# Patient Record
Sex: MALE | Race: BLACK OR AFRICAN AMERICAN | NOT HISPANIC OR LATINO | Employment: FULL TIME | URBAN - METROPOLITAN AREA
[De-identification: names, ages, dates, MRNs, and addresses within clinical notes are randomized per-mention and may not be internally consistent; named-entity substitution may affect disease eponyms.]

---

## 2018-01-04 ENCOUNTER — APPOINTMENT (OUTPATIENT)
Dept: GENERAL RADIOLOGY | Facility: CLINIC | Age: 33
End: 2018-01-04
Attending: EMERGENCY MEDICINE
Payer: COMMERCIAL

## 2018-01-04 ENCOUNTER — HOSPITAL ENCOUNTER (EMERGENCY)
Facility: CLINIC | Age: 33
Discharge: HOME | End: 2018-01-04
Attending: EMERGENCY MEDICINE | Admitting: EMERGENCY MEDICINE
Payer: COMMERCIAL

## 2018-01-04 VITALS
RESPIRATION RATE: 20 BRPM | SYSTOLIC BLOOD PRESSURE: 116 MMHG | DIASTOLIC BLOOD PRESSURE: 76 MMHG | OXYGEN SATURATION: 95 % | TEMPERATURE: 98.5 F | HEART RATE: 76 BPM

## 2018-01-04 DIAGNOSIS — J10.1 INFLUENZA A: ICD-10-CM

## 2018-01-04 DIAGNOSIS — R07.89 CHEST WALL PAIN: ICD-10-CM

## 2018-01-04 LAB
ALBUMIN SERPL-MCNC: 3.4 G/DL (ref 3.4–5)
ALP SERPL-CCNC: 57 U/L (ref 40–150)
ALT SERPL W P-5'-P-CCNC: 27 U/L (ref 0–70)
ANION GAP SERPL CALCULATED.3IONS-SCNC: 6 MMOL/L (ref 3–14)
AST SERPL W P-5'-P-CCNC: 23 U/L (ref 0–45)
BASOPHILS # BLD AUTO: 0 10E9/L (ref 0–0.2)
BASOPHILS NFR BLD AUTO: 0.2 %
BILIRUB SERPL-MCNC: 0.2 MG/DL (ref 0.2–1.3)
BUN SERPL-MCNC: 14 MG/DL (ref 7–30)
CALCIUM SERPL-MCNC: 8.5 MG/DL (ref 8.5–10.1)
CHLORIDE SERPL-SCNC: 108 MMOL/L (ref 94–109)
CO2 SERPL-SCNC: 29 MMOL/L (ref 20–32)
CREAT SERPL-MCNC: 0.82 MG/DL (ref 0.66–1.25)
DIFFERENTIAL METHOD BLD: ABNORMAL
EOSINOPHIL # BLD AUTO: 0.1 10E9/L (ref 0–0.7)
EOSINOPHIL NFR BLD AUTO: 1.3 %
ERYTHROCYTE [DISTWIDTH] IN BLOOD BY AUTOMATED COUNT: 12.7 % (ref 10–15)
FLUAV+FLUBV AG SPEC QL: NEGATIVE
FLUAV+FLUBV AG SPEC QL: POSITIVE
GFR SERPL CREATININE-BSD FRML MDRD: >90 ML/MIN/1.7M2
GLUCOSE SERPL-MCNC: 93 MG/DL (ref 70–99)
HCT VFR BLD AUTO: 39.9 % (ref 40–53)
HGB BLD-MCNC: 13.2 G/DL (ref 13.3–17.7)
IMM GRANULOCYTES # BLD: 0 10E9/L (ref 0–0.4)
IMM GRANULOCYTES NFR BLD: 0 %
LIPASE SERPL-CCNC: 135 U/L (ref 73–393)
LYMPHOCYTES # BLD AUTO: 1.7 10E9/L (ref 0.8–5.3)
LYMPHOCYTES NFR BLD AUTO: 39.1 %
MCH RBC QN AUTO: 28.6 PG (ref 26.5–33)
MCHC RBC AUTO-ENTMCNC: 33.1 G/DL (ref 31.5–36.5)
MCV RBC AUTO: 86 FL (ref 78–100)
MONOCYTES # BLD AUTO: 0.6 10E9/L (ref 0–1.3)
MONOCYTES NFR BLD AUTO: 12.8 %
NEUTROPHILS # BLD AUTO: 2.1 10E9/L (ref 1.6–8.3)
NEUTROPHILS NFR BLD AUTO: 46.6 %
NRBC # BLD AUTO: 0 10*3/UL
NRBC BLD AUTO-RTO: 0 /100
PLATELET # BLD AUTO: 205 10E9/L (ref 150–450)
POTASSIUM SERPL-SCNC: 3.8 MMOL/L (ref 3.4–5.3)
PROT SERPL-MCNC: 7.4 G/DL (ref 6.8–8.8)
RBC # BLD AUTO: 4.62 10E12/L (ref 4.4–5.9)
SODIUM SERPL-SCNC: 143 MMOL/L (ref 133–144)
SPECIMEN SOURCE: ABNORMAL
TROPONIN I SERPL-MCNC: <0.015 UG/L (ref 0–0.04)
WBC # BLD AUTO: 4.5 10E9/L (ref 4–11)

## 2018-01-04 PROCEDURE — 84484 ASSAY OF TROPONIN QUANT: CPT | Performed by: EMERGENCY MEDICINE

## 2018-01-04 PROCEDURE — 25000132 ZZH RX MED GY IP 250 OP 250 PS 637: Performed by: EMERGENCY MEDICINE

## 2018-01-04 PROCEDURE — 93010 ELECTROCARDIOGRAM REPORT: CPT | Mod: Z6 | Performed by: EMERGENCY MEDICINE

## 2018-01-04 PROCEDURE — 99285 EMERGENCY DEPT VISIT HI MDM: CPT | Mod: 25 | Performed by: EMERGENCY MEDICINE

## 2018-01-04 PROCEDURE — 99285 EMERGENCY DEPT VISIT HI MDM: CPT | Performed by: EMERGENCY MEDICINE

## 2018-01-04 PROCEDURE — 83690 ASSAY OF LIPASE: CPT | Performed by: EMERGENCY MEDICINE

## 2018-01-04 PROCEDURE — 93005 ELECTROCARDIOGRAM TRACING: CPT | Performed by: EMERGENCY MEDICINE

## 2018-01-04 PROCEDURE — 80053 COMPREHEN METABOLIC PANEL: CPT | Performed by: EMERGENCY MEDICINE

## 2018-01-04 PROCEDURE — 87804 INFLUENZA ASSAY W/OPTIC: CPT | Mod: 91 | Performed by: EMERGENCY MEDICINE

## 2018-01-04 PROCEDURE — 71046 X-RAY EXAM CHEST 2 VIEWS: CPT

## 2018-01-04 PROCEDURE — 85025 COMPLETE CBC W/AUTO DIFF WBC: CPT | Performed by: EMERGENCY MEDICINE

## 2018-01-04 RX ORDER — CODEINE PHOSPHATE AND GUAIFENESIN 10; 100 MG/5ML; MG/5ML
1-2 SOLUTION ORAL EVERY 6 HOURS PRN
Qty: 180 ML | Refills: 0 | Status: SHIPPED | OUTPATIENT
Start: 2018-01-04 | End: 2018-01-08

## 2018-01-04 RX ORDER — HYDROCODONE BITARTRATE AND ACETAMINOPHEN 5; 325 MG/1; MG/1
2 TABLET ORAL ONCE
Status: COMPLETED | OUTPATIENT
Start: 2018-01-04 | End: 2018-01-04

## 2018-01-04 RX ADMIN — HYDROCODONE BITARTRATE AND ACETAMINOPHEN 2 TABLET: 5; 325 TABLET ORAL at 11:50

## 2018-01-04 ASSESSMENT — ENCOUNTER SYMPTOMS
FEVER: 1
SHORTNESS OF BREATH: 1
HEADACHES: 1
COUGH: 1

## 2018-01-04 NOTE — ED AVS SNAPSHOT
[facility], Emergency Department    [address]    [place] [address]    Phone:  [phone]    Fax:  [phone]                                       Martínez Mccartney   MRN: [id number]    Department:  [facility], Emergency Department   Date of Visit:  1/4/2018           Patient Information     Date Of Birth          1985        Your diagnoses for this visit were:     Influenza A     Chest wall pain        You were seen by Kathy Rubio MD.        Discharge Instructions       Work note given (no work today and tomorrow).     You can alternate tylenol and ibuprofen for fever and body aches.    Dosing:  Ibuprofen 600 mg every 6 hours if needed (take with food to avoid stomach upset); Tylenol regular strength tablets:  650 mg every 6 hours if needed.     You can take robitussin AC for cough.         Influenza (Adult)    Influenza is also called the flu. It is a viral illness that affects the air passages of your lungs. It is different from the common cold. The flu can easily be passed from one to person to another. It may be spread through the air by coughing and sneezing. Or it can be spread by touching the sick person and then touching your own eyes, nose, or mouth.  The flu starts 1 to 3 days after you are exposed to the flu virus. It may last for 1 to 2 weeks but many people feel tired or fatigued for many weeks afterward. You usually don t need to take antibiotics unless you have a complication. This might be an ear or sinus infection or pneumonia.  Symptoms of the flu may be mild or severe. They can include extreme tiredness (wanting to stay in bed all day), chills, fevers, muscle aches, soreness with eye movement, headache, and a dry, hacking cough.  Home care  Follow these guidelines when caring for yourself at home:    Avoid being around cigarette smoke, whether yours or other people s.    Acetaminophen or ibuprofen will help ease your fever, muscle aches, and headache. Don t give aspirin  to anyone younger than 18 who has the flu. Aspirin can harm the liver.    Nausea and loss of appetite are common with the flu. Eat light meals. Drink 6 to 8 glasses of liquids every day. Good choices are water, sport drinks, soft drinks without caffeine, juices, tea, and soup. Extra fluids will also help loosen secretions in your nose and lungs.    Over-the-counter cold medicines will not make the flu go away faster. But the medicines may help with coughing, sore throat, and congestion in your nose and sinuses. Don t use a decongestant if you have high blood pressure.    Stay home until your fever has been gone for at least 24 hours without using medicine to reduce fever.  Follow-up care  Follow up with your healthcare provider, or as advised, if you are not getting better over the next week.  If you are age 65 or older, talk with your provider about getting a pneumococcal vaccine every 5 years. You should also get this vaccine if you have chronic asthma or COPD. All adults should get a flu vaccine every fall. Ask your provider about this.  When to seek medical advice  Call your healthcare provider right away if any of these occur:    Cough with lots of colored mucus (sputum) or blood in your mucus    Chest pain, shortness of breath, wheezing, or trouble breathing    Severe headache, or face, neck, or ear pain    New rash with fever    Fever of 100.4 F (38 C) or higher, or as directed by your healthcare provider    Confusion, behavior change, or seizure    Severe weakness or dizziness    You get a new fever or cough after getting better for a few days  Date Last Reviewed: 1/1/2017 2000-2017 [employer]. [address] [place] [address]. All rights reserved. This information is not intended as a substitute for professional medical care. Always follow your healthcare professional's instructions.          24 Hour Appointment Hotline       To make an appointment at any [facility], call  [phone] ([phone]). If you don't have a family doctor or clinic, we will help you find one. [facility] clinics are conveniently located to serve the needs of you and your family.             Review of your medicines      START taking        Dose / Directions Last dose taken    guaiFENesin-codeine 100-10 MG/5ML Soln solution   Commonly known as:  ROBITUSSIN AC   Dose:  1-2 tsp.   Quantity:  180 mL        Take 5-10 mLs by mouth every 6 hours as needed for cough   Refills:  0                Prescriptions were sent or printed at these locations (1 Prescription)                   Other Prescriptions                Printed at Department/Unit printer (1 of 1)         guaiFENesin-codeine (ROBITUSSIN AC) 100-10 MG/5ML SOLN solution                Procedures and tests performed during your visit     CBC with platelets differential    Comprehensive metabolic panel    Influenza A/B antigen swab    Lipase    Troponin I    XR Chest 2 Views      Orders Needing Specimen Collection     None      Pending Results     No orders found from 1/2/2018 to 1/5/2018.            Pending Culture Results     No orders found from 1/2/2018 to 1/5/2018.            Pending Results Instructions     If you had any lab results that were not finalized at the time of your Discharge, you can call the ED Lab Result RN at [phone]. You will be contacted by this team for any positive Lab results or changes in treatment. The nurses are available 7 days a week from 10A to 6:30P.  You can leave a message 24 hours per day and they will return your call.        Thank you for choosing [facility]       Thank you for choosing [facility] for your care. Our goal is always to provide you with excellent care. Hearing back from our patients is one way we can continue to improve our services. Please take a few minutes to complete the written survey that you may receive in the mail after you visit with us. Thank you!        MyChart Information     [email or web address] lets  "you send messages to your doctor, view your test results, renew your prescriptions, schedule appointments and more. To sign up, go to [email or web address] . Click on \"Log in\" on the left side of the screen, which will take you to the Welcome page. Then click on \"Sign up Now\" on the right side of the page.     You will be asked to enter the access code listed below, as well as some personal information. Please follow the directions to create your username and password.     Your access code is: [email or web address]  Expires: 2018  1:02 PM     Your access code will  in 90 days. If you need help or a new code, please call your [facility] clinic or [phone].        Care EveryWhere ID     This is your Care EveryWhere ID. This could be used by other organizations to access your [facility] medical records  [id number]        Equal Access to Services     MIRANDA JORGENSEN : Hadii soha Hathaway, waaxda goldy, qaybta kaalmada obdulia, sol portillo. So [facility] [phone].    ATENCIÓN: Si habla español, tiene a dooley disposición servicios gratuitos de asistencia lingüística. Llame al [phone].    We comply with applicable federal civil rights laws and [place] laws. We do not discriminate on the basis of race, color, national origin, age, disability, sex, sexual orientation, or gender identity.            After Visit Summary       This is your record. Keep this with you and show to your community pharmacist(s) and doctor(s) at your next visit.                  "

## 2018-01-04 NOTE — ED NOTES
Bed: ED19  Expected date: 1/4/18  Expected time: 10:57 AM  Means of arrival: Ambulance  Comments:  [facility]  32 Male  Chest wall pain, cough  Lithuanian  needed

## 2018-01-04 NOTE — ED AVS SNAPSHOT
[facility], [place], Emergency Department    [address]    [facility] [address]    Phone:  [phone]    Fax:  [phone]                                       Martínez Mccartney   MRN: [id number]    Department:  [facility], Emergency Department   Date of Visit:  1/4/2018           After Visit Summary Signature Page     I have received my discharge instructions, and my questions have been answered. I have discussed any challenges I see with this plan with the nurse or doctor.    ..........................................................................................................................................  Patient/Patient Representative Signature      ..........................................................................................................................................  Patient Representative Print Name and Relationship to Patient    ..................................................               ................................................  Date                                            Time    ..........................................................................................................................................  Reviewed by Signature/Title    ...................................................              ..............................................  Date                                                            Time

## 2018-01-04 NOTE — DISCHARGE INSTRUCTIONS
Work note given (no work today and tomorrow).     You can alternate tylenol and ibuprofen for fever and body aches.    Dosing:  Ibuprofen 600 mg every 6 hours if needed (take with food to avoid stomach upset); Tylenol regular strength tablets:  650 mg every 6 hours if needed.     You can take robitussin AC for cough.         Influenza (Adult)    Influenza is also called the flu. It is a viral illness that affects the air passages of your lungs. It is different from the common cold. The flu can easily be passed from one to person to another. It may be spread through the air by coughing and sneezing. Or it can be spread by touching the sick person and then touching your own eyes, nose, or mouth.  The flu starts 1 to 3 days after you are exposed to the flu virus. It may last for 1 to 2 weeks but many people feel tired or fatigued for many weeks afterward. You usually don t need to take antibiotics unless you have a complication. This might be an ear or sinus infection or pneumonia.  Symptoms of the flu may be mild or severe. They can include extreme tiredness (wanting to stay in bed all day), chills, fevers, muscle aches, soreness with eye movement, headache, and a dry, hacking cough.  Home care  Follow these guidelines when caring for yourself at home:    Avoid being around cigarette smoke, whether yours or other people s.    Acetaminophen or ibuprofen will help ease your fever, muscle aches, and headache. Don t give aspirin to anyone younger than 18 who has the flu. Aspirin can harm the liver.    Nausea and loss of appetite are common with the flu. Eat light meals. Drink 6 to 8 glasses of liquids every day. Good choices are water, sport drinks, soft drinks without caffeine, juices, tea, and soup. Extra fluids will also help loosen secretions in your nose and lungs.    Over-the-counter cold medicines will not make the flu go away faster. But the medicines may help with coughing, sore throat, and congestion in your  nose and sinuses. Don t use a decongestant if you have high blood pressure.    Stay home until your fever has been gone for at least 24 hours without using medicine to reduce fever.  Follow-up care  Follow up with your healthcare provider, or as advised, if you are not getting better over the next week.  If you are age 65 or older, talk with your provider about getting a pneumococcal vaccine every 5 years. You should also get this vaccine if you have chronic asthma or COPD. All adults should get a flu vaccine every fall. Ask your provider about this.  When to seek medical advice  Call your healthcare provider right away if any of these occur:    Cough with lots of colored mucus (sputum) or blood in your mucus    Chest pain, shortness of breath, wheezing, or trouble breathing    Severe headache, or face, neck, or ear pain    New rash with fever    Fever of 100.4 F (38 C) or higher, or as directed by your healthcare provider    Confusion, behavior change, or seizure    Severe weakness or dizziness    You get a new fever or cough after getting better for a few days  Date Last Reviewed: 1/1/2017 2000-2017 [employer]. [address] [place] [address]. All rights reserved. This information is not intended as a substitute for professional medical care. Always follow your healthcare professional's instructions.

## 2018-01-04 NOTE — ED PROVIDER NOTES
History     Chief Complaint   Patient presents with     Chest Wall Pain     cough, sent in from clinic, ekg from clinic NSR     The history is provided by the patient. The history is limited by a language barrier. A  was used.     Martínez Mccartney is a 32 year old male who presents to the Emergency Department from clinic for evaluation of chest wall pain. Patient reports that he is coughing about 5 days ago, and developed shortness of breath and left sided pain with his cough yesterday.  This pain is in his left chest, left shoulder, left back and is worse with movement.  He had a fever about 3 days ago which improved with ibuprofen.  He has had no runny nose and no productive cough.  Patient did not receive a flu shot this year.  He is a non-smoker and exercises regularly, and has had no issues with this.  He had an EKG done in clinic, which showed normal sinus rhythm.  He has high cholesterol.      History reviewed. No pertinent past medical history.    History reviewed. No pertinent surgical history.    No family history on file.    Social History   Substance Use Topics     Smoking status: Never Smoker     Smokeless tobacco: Never Used     Alcohol use No       No current facility-administered medications for this encounter.      Current Outpatient Prescriptions   Medication     guaiFENesin-codeine (ROBITUSSIN AC) 100-10 MG/5ML SOLN solution      No Known Allergies      I have reviewed the Medications, Allergies, Past Medical and Surgical History, and Social History in the Epic system.    Review of Systems   Constitutional: Positive for fever (at home).   Respiratory: Positive for cough and shortness of breath.    Cardiovascular: Positive for chest pain.   Neurological: Positive for headaches.       Physical Exam   BP: 123/84  Pulse: 72  Temp: 98.7  F (37.1  C)  Resp: 16  SpO2: 98 %      Physical Exam   Constitutional: He is oriented to person, place, and time. He appears well-developed and  well-nourished. No distress.   Looks a bit fatigued but not toxic.     HENT:   Head: Normocephalic and atraumatic.   Right Ear: External ear normal.   Left Ear: External ear normal.   Nose: Nose normal.   Mouth/Throat: Oropharynx is clear and moist.   Eyes: EOM are normal. Pupils are equal, round, and reactive to light.   Neck: Normal range of motion. Neck supple.   Cardiovascular: Normal rate, regular rhythm and normal heart sounds.    Pulmonary/Chest: Effort normal and breath sounds normal. No respiratory distress. He has no wheezes. He has no rales. He exhibits tenderness.       Musculoskeletal: Normal range of motion. He exhibits no edema, tenderness or deformity.   Neurological: He is alert and oriented to person, place, and time.   Skin: Skin is warm and dry. He is not diaphoretic.   Psychiatric: He has a normal mood and affect.   Nursing note and vitals reviewed.      ED Course   11:08 AM  The patient was seen and examined by Kathy Rubio MD in Room 19.     ED Course     Procedures             EKG Interpretation:      Interpreted by Kathy Rubio  Time reviewed: 1016  Symptoms at time of EKG: left sided chest pain with movement.   Rhythm: normal sinus   Rate: normal  Axis: normal  Ectopy: none  Conduction: normal  ST Segments/ T Waves: No ST-T wave changes  Q Waves: none  Comparison to prior: No old EKG available    Clinical Impression: normal EKG             Labs Ordered and Resulted from Time of ED Arrival Up to the Time of Departure from the ED   CBC WITH PLATELETS DIFFERENTIAL - Abnormal; Notable for the following:        Result Value    Hemoglobin 13.2 (*)     Hematocrit 39.9 (*)     All other components within normal limits   INFLUENZA A/B ANTIGEN - Abnormal; Notable for the following:     Influenza A Positive (*)     All other components within normal limits   TROPONIN I   COMPREHENSIVE METABOLIC PANEL   LIPASE          Results for orders placed or performed during the hospital encounter of 01/04/18 (from  the past 24 hour(s))   CBC with platelets differential   Result Value Ref Range    WBC 4.5 4.0 - 11.0 10e9/L    RBC Count 4.62 4.4 - 5.9 10e12/L    Hemoglobin 13.2 (L) 13.3 - 17.7 g/dL    Hematocrit 39.9 (L) 40.0 - 53.0 %    MCV 86 78 - 100 fl    MCH 28.6 26.5 - 33.0 pg    MCHC 33.1 31.5 - 36.5 g/dL    RDW 12.7 10.0 - 15.0 %    Platelet Count 205 150 - 450 10e9/L    Diff Method Automated Method     % Neutrophils 46.6 %    % Lymphocytes 39.1 %    % Monocytes 12.8 %    % Eosinophils 1.3 %    % Basophils 0.2 %    % Immature Granulocytes 0.0 %    Nucleated RBCs 0 0 /100    Absolute Neutrophil 2.1 1.6 - 8.3 10e9/L    Absolute Lymphocytes 1.7 0.8 - 5.3 10e9/L    Absolute Monocytes 0.6 0.0 - 1.3 10e9/L    Absolute Eosinophils 0.1 0.0 - 0.7 10e9/L    Absolute Basophils 0.0 0.0 - 0.2 10e9/L    Abs Immature Granulocytes 0.0 0 - 0.4 10e9/L    Absolute Nucleated RBC 0.0    Troponin I   Result Value Ref Range    Troponin I ES <0.015 0.000 - 0.045 ug/L   Comprehensive metabolic panel   Result Value Ref Range    Sodium 143 133 - 144 mmol/L    Potassium 3.8 3.4 - 5.3 mmol/L    Chloride 108 94 - 109 mmol/L    Carbon Dioxide 29 20 - 32 mmol/L    Anion Gap 6 3 - 14 mmol/L    Glucose 93 70 - 99 mg/dL    Urea Nitrogen 14 7 - 30 mg/dL    Creatinine 0.82 0.66 - 1.25 mg/dL    GFR Estimate >90 >60 mL/min/1.7m2    GFR Estimate If Black >90 >60 mL/min/1.7m2    Calcium 8.5 8.5 - 10.1 mg/dL    Bilirubin Total 0.2 0.2 - 1.3 mg/dL    Albumin 3.4 3.4 - 5.0 g/dL    Protein Total 7.4 6.8 - 8.8 g/dL    Alkaline Phosphatase 57 40 - 150 U/L    ALT 27 0 - 70 U/L    AST 23 0 - 45 U/L   Lipase   Result Value Ref Range    Lipase 135 73 - 393 U/L   Influenza A/B antigen swab   Result Value Ref Range    Influenza A/B Agn Specimen Nasopharyngeal     Influenza A Positive (A) NEG^Negative    Influenza B Negative NEG^Negative   XR Chest 2 Views    Narrative    XR CHEST 2 VW 1/4/2018 12:06 PM    HISTORY: Cough.    COMPARISON: None.    FINDINGS: No airspace  consolidation, pleural effusion or pneumothorax.  Normal heart size.      Impression    IMPRESSION: No acute cardiopulmonary abnormality.    BUTCH ZEPEDA MD       Assessments & Plan (with Medical Decision Making)   The patient presents with cough since last weekend,and then left sided chest wall pain for 1 days.  He was seen at clinic and sent here for chest pain evaluation.  He is 31 yo and has high cholesterol for cardiac risk factors.  Diff dx includes:  Influenza, pneumonia, pneumothorax, chest wall pain, pancreatitis, costochondritis, ACS.  He was given vicodin for left sided chest wall pain with palpation.  Labs and cxr were ordered.  ECG is normal.  CBC, comp met, lipase and troponin are normal.  Influenza A is positive.  He is too late to start tamiflu, but test was done for dx reasons.  CXR shows no pneumonia or pneumothorax.  I have reviewed the results with the patient and family.  Work note was given.  He was d/c home with robitussin AC.  Tylenol and ibuprofen recommended.        I have reviewed the nursing notes.    I have reviewed the findings, diagnosis, plan and need for follow up with the patient.    New Prescriptions    GUAIFENESIN-CODEINE (ROBITUSSIN AC) 100-10 MG/5ML SOLN SOLUTION    Take 5-10 mLs by mouth every 6 hours as needed for cough       Final diagnoses:   Influenza A   Chest wall pain   I, Sammie Zepeda, am serving as a trained medical scribe to document services personally performed by Kathy Rubio MD, based on the provider's statements to me.      IKathy MD, was physically present and have reviewed and verified the accuracy of this note documented by Sammie Zepeda.       1/4/2018   [facility], [place], EMERGENCY DEPARTMENT     Kathy Rubio MD  01/04/18 5940

## 2018-01-04 NOTE — LETTER
[facility], [place], EMERGENCY DEPARTMENT  [address] [place] [address]  [phone]      2018    Martínez Mccartney  [address]  [place] [address]  [phone] (home)     : 1985      To Whom it may concern:    Martínez Mccartney was seen in our Emergency Department today, 2018.  He was diagnosed with influenza A.  Please excuse him from work on  and .       Sincerely,    Kathy Rubio MD

## 2021-12-09 ENCOUNTER — OFFICE VISIT (OUTPATIENT)
Dept: FAMILY MEDICINE | Facility: CLINIC | Age: 36
End: 2021-12-09
Payer: COMMERCIAL

## 2021-12-09 VITALS
TEMPERATURE: 98.4 F | RESPIRATION RATE: 16 BRPM | DIASTOLIC BLOOD PRESSURE: 89 MMHG | HEART RATE: 84 BPM | OXYGEN SATURATION: 99 % | SYSTOLIC BLOOD PRESSURE: 139 MMHG

## 2021-12-09 DIAGNOSIS — K11.20 SIALADENITIS: Primary | ICD-10-CM

## 2021-12-09 DIAGNOSIS — Z11.4 SCREENING FOR HIV (HUMAN IMMUNODEFICIENCY VIRUS): ICD-10-CM

## 2021-12-09 PROBLEM — E78.6 LOW HDL (UNDER 40): Status: ACTIVE | Noted: 2017-01-09

## 2021-12-09 PROCEDURE — 99203 OFFICE O/P NEW LOW 30 MIN: CPT | Mod: GC | Performed by: STUDENT IN AN ORGANIZED HEALTH CARE EDUCATION/TRAINING PROGRAM

## 2021-12-09 RX ORDER — IBUPROFEN 400 MG/1
400 TABLET, FILM COATED ORAL EVERY 6 HOURS PRN
Qty: 90 TABLET | Refills: 1 | Status: SHIPPED | OUTPATIENT
Start: 2021-12-09 | End: 2024-07-03

## 2021-12-09 RX ORDER — ACETAMINOPHEN 325 MG/1
325-650 TABLET ORAL EVERY 6 HOURS PRN
Qty: 90 TABLET | Refills: 3 | Status: SHIPPED | OUTPATIENT
Start: 2021-12-09

## 2021-12-09 NOTE — PROGRESS NOTES
[facility] Note  [facility]     Assessment and Plan   Martínez Mccartney is a 36 year old who presented to clinic today for:     Right submandibular gland swelling- sialadenitis  Two days of right submandibular gland painful swelling, gradually improving since onset. Associated w/ pain w/ swallowing and pain to palpation. Otherwise asymptomatic w/o fever, headache, ear pain, upper respiratory symptoms, etc. He is a non-smoker. His child did have what sounds like viral URI last week. Exam w/ right submandibular gland swelling (minimally larger than left) and tenderness to palpation + small right anterior cervical lymph node. Poor dentition, but no pain to palpation of gum line and no giancarlo abscess. Exam otherwise normal.     Discussed differential including salivary duct stone vs viral sialadenitis vs bacterial sialadenitis vs autoimmune disease. Most likely stone vs viral sialadenitis. Deferred imaging or workup at this time given he is afebrile, well-appearing, pain is improving. Encouraged adequate hydration, pain management w/ ibuprofen/tylenol/warm packs, sucking on hard candies. If symptoms do not improve within 1.5-2weeks, or if he develops fever, or if he develops pain so severe he cannot eat or drink, then he should return to clinic for further workup.     HM  [place] [facility] [place] 5/1/21.   Declined booster today.          HPI       Martínez Mccartney is a 36 year old who presents for Jaw Pain (Patient reports right side of jawline being achy to the touch,x2 days here to discus with provider), Physical, and Pharyngitis (Patient declined having cough,sore throat,bodyaches,SOB or last of taste or smell.Patient reports a negative covid test on 12/06 here to discus jaw pain and have a physical)    Right submandibular gland tenderness to palpation since Tue 12/7.   Pain w/ swallowing, but still able to eat and drink.   No fevers, headaches, shortness of breath, chest  pain, cough, runny nose, itchy eyes, ear pain, etc.   Hasn't had these symptoms before.   Today is better than yesterday.     Has three kids-- 6 months, 5 years, and 7 years.   5 year old felt ill last week w/ cough, throwing up, fever. Was negative for covid. Happened after eating dorito (unclear significance--didn't choke, dad concerned for possible allergy).              Physical Exam:   /89   Pulse 84   Temp 98.4  F (36.9  C) (Oral)   Resp 16   SpO2 99%     General: Sitting upright comfortably. Engaged.   Head: Normocephalic,  Atraumatic  Neck: Mild tenderness with left lateral side bending; otherwise ROM intact w/o pain. Mild tenderness to palpation over right submandibular gland. Right submandibular gland mildly swollen compared to left. No superimposed erythema. Small right anterior cervical lymph node.   No loss of taste or smell. No body aches.   Oropharynx: appears normal. Tonsils normal w/o erythema or exudate. Gum line normal w/o tenderness to palpation. Poor dentition.   Ears: TM normal bilaterally.   Eyes: No conjunctival injection.   Nares: Patent.         Results:     No results found for any visits on 12/09/21.      Options for treatment and follow-up care were reviewed with the patient. Martínez Mccartney engaged in the decision making process and verbalized understanding of the options discussed and agreed with the final plan.    Charo Weiner MD, she/her, PGY-3, [facility]

## 2021-12-09 NOTE — PATIENT INSTRUCTIONS
-  Drink plenty of water  - Take tylenol or ibuprofen for pain as needed   - Apply hot packs over tender area as needed  - Try sucking on hard candy    Patient Education     Salivary Gland Swelling, Uncertain Cause  Salivary glands make saliva in response to food in your mouth. Saliva is mostly water. It also has minerals and proteins that help break down food and keep the mouth and teeth healthy. There are 3 pairs of salivary glands:     Parotid glands. In front of the ear.    Submandibular glands. Below the jaw.    Sublingual glands. Below the tongue.  Each gland has a tube (duct) that carries saliva from the gland into the mouth.    The salivary glands can sometimes get swollen. Causes can include:     Viral infection (such as childhood mumps)    Bacterial infections    Sjögren syndrome    Diabetes    Malnutrition    Sarcoidosis    Blocked salivary duct (from stones or tumors)  Certain medicines can affect salivary flow. This can lead to swollen glands. Tell your healthcare provider about all of the medicines you take.   Tests can be done to find the cause of the swelling, but we are going to wait on any of these for now. IF YOU DEVELOP fever, pain so severe that you can't eat or drink, or the pain is still present in 1.5-2 weeks, then come back and we can do further evaluation.  These may include blood tests, X-ray, ultrasound, CT scan, or injecting dye into the duct to look for blockage. Treatment depends on the exact cause of the swelling.   Home care    If the area is painful, you can take over-the-counter medicines, such as acetaminophen or ibuprofen, unless you were prescribed another medicine. Wetting a cloth with warm water and putting it over the affected gland for 10 to 15 minutes at a time can also help ease pain.    To help prevent blockages and infections:  ? Drink 6 to 8 glasses of fluid per day (such as water, tea, and clear soup) to keep well-hydrated.  ? If you smoke, ask your healthcare provider  for help to quit. Smoking makes salivary gland stones more likely.  ? Maintain good dental hygiene. Brush and floss your teeth daily. See your dentist for regular cleanings.    Follow-up care  Follow up with your healthcare provider, or as advised. See your healthcare provider for further exams and testing. If you have been referred to a specialist, make an appointment right away.   When to get medical advice  Call your healthcare provider if any of the following occur:    More pain or swelling in the gland    Inability to open mouth or pain when opening mouth    Fever of 100.4 F (38 C) or higher, or as directed by your provider    Redness over the gland    Fluid (pus) draining into the mouth    Trouble breathing or swallowing    Any new symptoms  Prevention  Here are steps you can take to help prevent an infection:    Keep good handwashing habits.    Don t have close contact with people who have sore throats, colds, or other upper respiratory infections.    Don t smoke, and stay away from secondhand smoke.    Stay up to date with of your vaccines.  [employer] last reviewed this educational content on 3/1/2020    1237-0206 [employer]. All rights reserved. This information is not intended as a substitute for professional medical care. Always follow your healthcare professional's instructions.

## 2021-12-22 NOTE — PROGRESS NOTES
Preceptor Attestation:   Patient seen, evaluated and discussed with the resident. I have verified the content of the note, which accurately reflects my assessment of the patient and the plan of care.   Supervising Physician:  Jess Chavez, DO

## 2022-05-05 ENCOUNTER — OFFICE VISIT (OUTPATIENT)
Dept: FAMILY MEDICINE | Facility: CLINIC | Age: 37
End: 2022-05-05
Payer: COMMERCIAL

## 2022-05-05 VITALS
RESPIRATION RATE: 16 BRPM | OXYGEN SATURATION: 97 % | DIASTOLIC BLOOD PRESSURE: 89 MMHG | TEMPERATURE: 98.4 F | HEART RATE: 71 BPM | SYSTOLIC BLOOD PRESSURE: 133 MMHG | WEIGHT: 146 LBS

## 2022-05-05 DIAGNOSIS — M79.671 ARCH PAIN OF RIGHT FOOT: Primary | ICD-10-CM

## 2022-05-05 PROCEDURE — 99213 OFFICE O/P EST LOW 20 MIN: CPT | Performed by: FAMILY MEDICINE

## 2022-05-05 NOTE — PATIENT INSTRUCTIONS
"Arch support - JANNETTE Green model (wear every day)    Stretches/exercises    1) Do the \"alphabet\" with your foot twice each day (for strengthening the ankles)  2) Put towel down on the floor and pull it toward you with your toes, twice per day (strengthen midfoot)  3) Tennis ball stretch with foot twice/day  4) Stretch leg and foot twice each day      Some resources:  [email or web address]/blog/stop-foot-pain-seven-easy-exercises/          "

## 2022-05-05 NOTE — PROGRESS NOTES
Assessment & Plan     Arch pain of right foot  No imaging today; appears to be soft tissue related, likely strain of arch without good support  Trial of arch supports in shoes (Super Feet green suggested)  Foot stretching exercises discussed and shown (also showed some youtube videos)  Consider formal PT if not improving      Diagnosis or treatment significantly limited by social determinants of health - language barrier  25 minutes spent on the date of the encounter doing chart review, history and exam, documentation and further activities per the note           Return if symptoms worsen or fail to improve.    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      Objective    /89   Pulse 71   Temp 98.4  F (36.9  C) (Oral)   Resp 16   Wt 66.2 kg (146 lb)   SpO2 97%   There is no height or weight on file to calculate BMI.  Physical Exam  Constitutional:       Appearance: Normal appearance.   Pulmonary:      Effort: Pulmonary effort is normal.   Musculoskeletal:        Feet:    Neurological:      General: No focal  deficit present.      Mental Status: He is alert and oriented to person, place, and time.   Psychiatric:         Mood and Affect: Mood normal.         Behavior: Behavior normal.         Thought Content: Thought content normal.         Judgment: Judgment normal.

## 2022-05-05 NOTE — NURSING NOTE
Due to patient being non-English speaking/uses sign language, an  was used for this visit. Only for face-to-face interpretation by an external agency, date and length of interpretation can be found on the scanned worksheet.     name: [id number]  Agency: [employer]  Language: Alex   Telephone number: [phone]   Type of interpretation: Telephone, spoken

## 2023-06-08 ENCOUNTER — HOSPITAL ENCOUNTER (EMERGENCY)
Facility: CLINIC | Age: 38
Discharge: HOME | End: 2023-06-08
Attending: EMERGENCY MEDICINE | Admitting: EMERGENCY MEDICINE
Payer: COMMERCIAL

## 2023-06-08 ENCOUNTER — APPOINTMENT (OUTPATIENT)
Dept: GENERAL RADIOLOGY | Facility: CLINIC | Age: 38
End: 2023-06-08
Attending: EMERGENCY MEDICINE
Payer: COMMERCIAL

## 2023-06-08 VITALS
WEIGHT: 150.9 LBS | HEART RATE: 68 BPM | SYSTOLIC BLOOD PRESSURE: 121 MMHG | RESPIRATION RATE: 18 BRPM | TEMPERATURE: 98.3 F | OXYGEN SATURATION: 100 % | DIASTOLIC BLOOD PRESSURE: 82 MMHG | HEIGHT: 69 IN | BODY MASS INDEX: 22.35 KG/M2

## 2023-06-08 DIAGNOSIS — J02.9 VIRAL PHARYNGITIS: ICD-10-CM

## 2023-06-08 LAB
ATRIAL RATE - MUSE: 71 BPM
DEPRECATED S PYO AG THROAT QL EIA: NEGATIVE
DIASTOLIC BLOOD PRESSURE - MUSE: NORMAL MMHG
FLUAV RNA SPEC QL NAA+PROBE: NEGATIVE
FLUBV RNA RESP QL NAA+PROBE: NEGATIVE
GROUP A STREP BY PCR: NOT DETECTED
INTERPRETATION ECG - MUSE: NORMAL
P AXIS - MUSE: 52 DEGREES
PR INTERVAL - MUSE: 176 MS
QRS DURATION - MUSE: 108 MS
QT - MUSE: 374 MS
QTC - MUSE: 406 MS
R AXIS - MUSE: 44 DEGREES
RSV RNA SPEC NAA+PROBE: NEGATIVE
SARS-COV-2 RNA RESP QL NAA+PROBE: NEGATIVE
SYSTOLIC BLOOD PRESSURE - MUSE: NORMAL MMHG
T AXIS - MUSE: 39 DEGREES
VENTRICULAR RATE- MUSE: 71 BPM

## 2023-06-08 PROCEDURE — 87651 STREP A DNA AMP PROBE: CPT | Performed by: EMERGENCY MEDICINE

## 2023-06-08 PROCEDURE — 250N000013 HC RX MED GY IP 250 OP 250 PS 637: Performed by: EMERGENCY MEDICINE

## 2023-06-08 PROCEDURE — 93010 ELECTROCARDIOGRAM REPORT: CPT | Performed by: EMERGENCY MEDICINE

## 2023-06-08 PROCEDURE — 99285 EMERGENCY DEPT VISIT HI MDM: CPT | Mod: 25 | Performed by: EMERGENCY MEDICINE

## 2023-06-08 PROCEDURE — 93005 ELECTROCARDIOGRAM TRACING: CPT | Performed by: EMERGENCY MEDICINE

## 2023-06-08 PROCEDURE — 87637 SARSCOV2&INF A&B&RSV AMP PRB: CPT | Performed by: EMERGENCY MEDICINE

## 2023-06-08 PROCEDURE — 71046 X-RAY EXAM CHEST 2 VIEWS: CPT

## 2023-06-08 PROCEDURE — 99284 EMERGENCY DEPT VISIT MOD MDM: CPT | Mod: 25 | Performed by: EMERGENCY MEDICINE

## 2023-06-08 RX ORDER — IBUPROFEN 600 MG/1
600 TABLET, FILM COATED ORAL ONCE
Status: COMPLETED | OUTPATIENT
Start: 2023-06-08 | End: 2023-06-08

## 2023-06-08 RX ADMIN — IBUPROFEN 600 MG: 600 TABLET ORAL at 08:53

## 2023-06-08 ASSESSMENT — ACTIVITIES OF DAILY LIVING (ADL)
ADLS_ACUITY_SCORE: 33
ADLS_ACUITY_SCORE: 35

## 2023-06-08 NOTE — DISCHARGE INSTRUCTIONS
Take ibuprofen 600 mg every 6 hours with food as needed for pain.  You may also take doses of acetaminophen in between doses of ibuprofen.  Drink plenty of fluids.    Follow-up with your primary care clinic next week if not improving.    Return to the emergency department if difficulty swallowing, worse, or other concerns.

## 2023-06-08 NOTE — ED PROVIDER NOTES
"ED Provider Note  [facility]      History     Chief Complaint   Patient presents with     Cough     Pharyngitis     HPI  Martínez Mccartney is a 37 year old male who presents to the emergency department with a 4-day history of sore throat, hoarse voice, and nonproductive cough.  Patient denies any fever.  Patient states his bilateral chest is aching when he coughs.  He denies any shortness of breath.  No leg pain or swelling.  No abdominal pain.  No nausea or vomiting.  He denies any known ill contacts.  He took an allergy medication without improvement of his symptoms.    Past Medical History  History reviewed. No pertinent past medical history.  History reviewed. No pertinent surgical history.  acetaminophen (TYLENOL) 325 MG tablet  ibuprofen (ADVIL/MOTRIN) 400 MG tablet      Allergies   Allergen Reactions     Seasonal Allergies      Family History  Family History   Problem Relation Age of Onset     Cancer No family hx of      Diabetes No family hx of      Coronary Artery Disease No family hx of      Heart Disease No family hx of      Social History   Social History     Tobacco Use     Smoking status: Never     Smokeless tobacco: Never   Vaping Use     Vaping status: Never Used   Substance Use Topics     Alcohol use: No     Drug use: No         A medically appropriate review of systems was performed with pertinent positives and negatives noted in the HPI, and all other systems negative.    Physical Exam   BP: (!) 140/84  Pulse: 69  Temp: 98.3  F (36.8  C)  Resp: 18  Height: 175.3 cm (5' 9\")  Weight: 68.4 kg (150 lb 14.4 oz)  SpO2: 98 %  Physical Exam  Vitals and nursing note reviewed.   Constitutional:       General: He is not in acute distress.     Appearance: He is well-developed. He is not diaphoretic.   HENT:      Head: Normocephalic and atraumatic.      Nose: No congestion.      Mouth/Throat:      Mouth: Mucous membranes are moist.      Pharynx: Uvula midline. Posterior oropharyngeal " erythema present.   Eyes:      General: No scleral icterus.     Pupils: Pupils are equal, round, and reactive to light.   Cardiovascular:      Rate and Rhythm: Normal rate and regular rhythm.      Heart sounds: Normal heart sounds.   Pulmonary:      Effort: Pulmonary effort is normal. No respiratory distress.      Breath sounds: Normal breath sounds.   Abdominal:      General: Bowel sounds are normal.      Palpations: Abdomen is soft.      Tenderness: There is no abdominal tenderness.   Musculoskeletal:         General: No tenderness.      Cervical back: Normal range of motion.   Lymphadenopathy:      Cervical: No cervical adenopathy.   Skin:     General: Skin is warm.      Findings: No rash.   Neurological:      General: No focal deficit present.      Mental Status: He is alert.           ED Course, Procedures, & Data      Procedures       ED Course Selections:        EKG Interpretation:      Interpreted by KIP JACOB MD, MD  Time reviewed: 0730  Symptoms at time of EKG: chest pain   Rhythm: normal sinus   Rate: normal  Axis: normal  Ectopy: none  Conduction: normal  ST Segments/ T Waves: No ST-T wave changes  Q Waves: none  Comparison to prior: No old EKG available    Clinical Impression: normal EKG            Results for orders placed or performed during the hospital encounter of 06/08/23   XR Chest 2 Views     Status: None    Narrative    CHEST TWO VIEWS  6/8/2023 7:44 AM     HISTORY:  Chest pain. Cough.    COMPARISON: 1/4/2018.      Impression    IMPRESSION: Negative chest. Lungs clear.    HARRY JOHNSON MD         SYSTEM ID:  [id number]   Symptomatic Influenza A/B, RSV, & SARS-CoV2 PCR (COVID-19) Nasopharyngeal     Status: Normal    Specimen: Nasopharyngeal; Swab   Result Value Ref Range    Influenza A PCR Negative Negative    Influenza B PCR Negative Negative    RSV PCR Negative Negative    SARS CoV2 PCR Negative Negative    Narrative    Testing was performed using the Xpert Xpress CoV2/Flu/RSV Assay on the  [employer] Instrument. This test should be ordered for the detection of SARS-CoV-2, influenza, and RSV viruses in individuals who meet clinical and/or epidemiological criteria. Test performance is unknown in asymptomatic patients. This test is for in vitro diagnostic use under the FDA EUA for laboratories certified under CLIA to perform high or moderate complexity testing. This test has not been FDA cleared or approved. A negative result does not rule out the presence of PCR inhibitors in the specimen or target RNA in concentration below the limit of detection for the assay. If only one viral target is positive but coinfection with multiple targets is suspected, the sample should be re-tested with another FDA cleared, approved, or authorized test, if coinfection would change clinical management. This test was validated by the [facility] [employer]. These laboratories are certified under the Clinical Laboratory Improvement Amendments of 1988 (CLIA-88) as qualified to perform high complexity laboratory testing.   EKG 12-lead, tracing only     Status: None   Result Value Ref Range    Systolic Blood Pressure  mmHg    Diastolic Blood Pressure  mmHg    Ventricular Rate 71 BPM    Atrial Rate 71 BPM    HI Interval 176 ms    QRS Duration 108 ms     ms    QTc 406 ms    P Axis 52 degrees    R AXIS 44 degrees    T Axis 39 degrees    Interpretation ECG       Sinus rhythm  Normal ECG    Unconfirmed report - interpretation of this ECG is computer generated - see medical record for final interpretation  Confirmed by - EMERGENCY ROOM, PHYSICIAN ([id number]),  RAYMOND CAMPBELL ([id number]) on 6/8/2023 8:36:48 AM     Streptococcus A Rapid Screen w/Reflex to PCR     Status: Normal    Specimen: Throat; Swab   Result Value Ref Range    Group A Strep antigen Negative Negative   Group A Streptococcus PCR Throat Swab     Status: Normal    Specimen: Throat; Swab   Result Value Ref Range    Group A strep by PCR Not Detected  Not Detected    Narrative    The Xpert Xpress Strep A test, performed on the [employer] Systems, is a rapid, qualitative in vitro diagnostic test for the detection of Streptococcus pyogenes (Group A ß-hemolytic Streptococcus, Strep A) in throat swab specimens from patients with signs and symptoms of pharyngitis. The Xpert Xpress Strep A test can be used as an aid in the diagnosis of Group A Streptococcal pharyngitis. The assay is not intended to monitor treatment for Group A Streptococcus infections. The Xpert Xpress Strep A test utilizes an automated real-time polymerase chain reaction (PCR) to detect Streptococcus pyogenes DNA.             Results for orders placed or performed during the hospital encounter of 06/08/23   XR Chest 2 Views     Status: None    Narrative    CHEST TWO VIEWS  6/8/2023 7:44 AM     HISTORY:  Chest pain. Cough.    COMPARISON: 1/4/2018.      Impression    IMPRESSION: Negative chest. Lungs clear.    HARRY JOHNSON MD         SYSTEM ID:  [id number]   Symptomatic Influenza A/B, RSV, & SARS-CoV2 PCR (COVID-19) Nasopharyngeal     Status: Normal    Specimen: Nasopharyngeal; Swab   Result Value Ref Range    Influenza A PCR Negative Negative    Influenza B PCR Negative Negative    RSV PCR Negative Negative    SARS CoV2 PCR Negative Negative    Narrative    Testing was performed using the Xpert Xpress CoV2/Flu/RSV Assay on the [employer] Instrument. This test should be ordered for the detection of SARS-CoV-2, influenza, and RSV viruses in individuals who meet clinical and/or epidemiological criteria. Test performance is unknown in asymptomatic patients. This test is for in vitro diagnostic use under the FDA EUA for laboratories certified under CLIA to perform high or moderate complexity testing. This test has not been FDA cleared or approved. A negative result does not rule out the presence of PCR inhibitors in the specimen or target RNA in concentration below the limit of  detection for the assay. If only one viral target is positive but coinfection with multiple targets is suspected, the sample should be re-tested with another FDA cleared, approved, or authorized test, if coinfection would change clinical management. This test was validated by the [facility]. These laboratories are certified under the Clinical Laboratory Improvement Amendments of 1988 (CLIA-88) as qualified to perform high complexity laboratory testing.   EKG 12-lead, tracing only     Status: None   Result Value Ref Range    Systolic Blood Pressure  mmHg    Diastolic Blood Pressure  mmHg    Ventricular Rate 71 BPM    Atrial Rate 71 BPM    OH Interval 176 ms    QRS Duration 108 ms     ms    QTc 406 ms    P Axis 52 degrees    R AXIS 44 degrees    T Axis 39 degrees    Interpretation ECG       Sinus rhythm  Normal ECG    Unconfirmed report - interpretation of this ECG is computer generated - see medical record for final interpretation  Confirmed by - EMERGENCY ROOM, PHYSICIAN ([id number]),  RAYMOND CAMPBELL ([id number]) on 6/8/2023 8:36:48 AM     Streptococcus A Rapid Screen w/Reflex to PCR     Status: Normal    Specimen: Throat; Swab   Result Value Ref Range    Group A Strep antigen Negative Negative   Group A Streptococcus PCR Throat Swab     Status: Normal    Specimen: Throat; Swab   Result Value Ref Range    Group A strep by PCR Not Detected Not Detected    Narrative    The Xpert Xpress Strep A test, performed on the [employer], is a rapid, qualitative in vitro diagnostic test for the detection of Streptococcus pyogenes (Group A ß-hemolytic Streptococcus, Strep A) in throat swab specimens from patients with signs and symptoms of pharyngitis. The Xpert Xpress Strep A test can be used as an aid in the diagnosis of Group A Streptococcal pharyngitis. The assay is not intended to monitor treatment for Group A Streptococcus infections. The Xpert Xpress Strep A test utilizes an  automated real-time polymerase chain reaction (PCR) to detect Streptococcus pyogenes DNA.     Medications   ibuprofen (ADVIL/MOTRIN) tablet 600 mg (600 mg Oral $Given 6/8/23 7591)     Labs Ordered and Resulted from Time of ED Arrival to Time of ED Departure   INFLUENZA A/B, RSV, & SARS-COV2 PCR - Normal       Result Value    Influenza A PCR Negative      Influenza B PCR Negative      RSV PCR Negative      SARS CoV2 PCR Negative     STREPTOCOCCUS A RAPID SCREEN W REFELX TO PCR - Normal    Group A Strep antigen Negative     GROUP A STREPTOCOCCUS PCR THROAT SWAB - Normal    Group A strep by PCR Not Detected       XR Chest 2 Views   Final Result   IMPRESSION: Negative chest. Lungs clear.      HARRY JOHNSON MD            SYSTEM ID:  [id number]             Critical care was not performed.     Medical Decision Making  The patient's presentation was of moderate complexity (an undiagnosed new problem with uncertain diagnosis).    The patient's evaluation involved:  review of 3+ test result(s) ordered prior to this encounter (see separate area of note for details)    The patient's management necessitated only low risk treatment.      Assessment & Plan    .37 year old male to the emergency room with 4-day history of sore throat and nonproductive cough.  Differential includes strep, COVID, influenza, viral syndrome, pneumonia.  Less likely ACS and pneumothorax but will evaluate with EKG and chest radiograph.  Patient is EKG is normal.  Do not suspect ACS.  Chest radiograph does not reveal any evidence for pneumonia or pneumothorax.  The patient's strep screen is negative.  COVID and influenza testing is negative.  Suspect symptoms related to viral syndrome.  He appears clinically well and appropriate for outpatient management.  Will discharge to home with supportive care.  Primary care follow-up next week if not improving.  Return precautions provided.    I have reviewed the nursing notes. I have reviewed the findings,  diagnosis, plan and need for follow up with the patient.    New Prescriptions    No medications on file       Final diagnoses:   Viral pharyngitis     Chart documentation was completed with Dragon voice-recognition software. Even though reviewed, this chart may still contain some grammatical, spelling, and word errors.       Sharath Caballero Md  [facility] EMERGENCY DEPARTMENT  6/8/2023     Sharath Caballero MD  06/08/23 0951

## 2023-06-08 NOTE — ED TRIAGE NOTES
Pt reports sore throat and cough for 3 days but originally thought it was allergies but has been getting worse overnight time. Pt reports dry non productive cough that some times instigates epistaxis.       Triage Assessment     Row Name 06/08/23 0643       Triage Assessment (Adult)    Airway WDL airway symptoms       Respiratory WDL    Respiratory WDL X;cough    Cough Frequency frequent    Cough Type dry;nonproductive       Skin Circulation/Temperature WDL    Skin Circulation/Temperature WDL WDL       Cardiac WDL    Cardiac WDL WDL       Peripheral/Neurovascular WDL    Peripheral Neurovascular WDL WDL       Cognitive/Neuro/Behavioral WDL    Cognitive/Neuro/Behavioral WDL WDL

## 2023-10-05 ENCOUNTER — HOSPITAL ENCOUNTER (EMERGENCY)
Facility: CLINIC | Age: 38
Discharge: HOME | End: 2023-10-05
Attending: EMERGENCY MEDICINE | Admitting: EMERGENCY MEDICINE
Payer: COMMERCIAL

## 2023-10-05 VITALS
WEIGHT: 152.2 LBS | DIASTOLIC BLOOD PRESSURE: 84 MMHG | TEMPERATURE: 97.5 F | HEART RATE: 70 BPM | HEIGHT: 69 IN | OXYGEN SATURATION: 99 % | SYSTOLIC BLOOD PRESSURE: 148 MMHG | BODY MASS INDEX: 22.54 KG/M2 | RESPIRATION RATE: 18 BRPM

## 2023-10-05 DIAGNOSIS — U07.1 COVID-19 VIRUS INFECTION: ICD-10-CM

## 2023-10-05 LAB
FLUAV RNA SPEC QL NAA+PROBE: NEGATIVE
FLUBV RNA RESP QL NAA+PROBE: NEGATIVE
RSV RNA SPEC NAA+PROBE: NEGATIVE
SARS-COV-2 RNA RESP QL NAA+PROBE: POSITIVE

## 2023-10-05 PROCEDURE — 99284 EMERGENCY DEPT VISIT MOD MDM: CPT

## 2023-10-05 PROCEDURE — 87637 SARSCOV2&INF A&B&RSV AMP PRB: CPT | Performed by: EMERGENCY MEDICINE

## 2023-10-05 PROCEDURE — 99283 EMERGENCY DEPT VISIT LOW MDM: CPT | Performed by: EMERGENCY MEDICINE

## 2023-10-05 RX ORDER — BENZONATATE 100 MG/1
100-200 CAPSULE ORAL 3 TIMES DAILY PRN
Qty: 30 CAPSULE | Refills: 0 | Status: SHIPPED | OUTPATIENT
Start: 2023-10-05

## 2023-10-05 ASSESSMENT — ACTIVITIES OF DAILY LIVING (ADL): ADLS_ACUITY_SCORE: 33

## 2023-10-05 NOTE — DISCHARGE INSTRUCTIONS
Home today to isolate and rest    Fill your prescriptions and take as directed    Stay hydrated    Please make an appointment to follow up with Your Primary Care Provider in 2 weeks  if not improving.    Return to the ER for worsening

## 2023-10-05 NOTE — Clinical Note
Martínez Fuentes was seen and treated in our emergency department on 10/5/2023.  He may return to work on 10/11/2023.       If you have any questions or concerns, please don't hesitate to call.      Moiz Castillo MD

## 2023-10-05 NOTE — ED PROVIDER NOTES
ED Provider Note  [facility]      History     Chief Complaint   Patient presents with    URI     Positive for Covid.  Cough, small fever, running nose.  States he is better than yesterday.  Started on Tuesday.     HPI  Martínez Fuentes is a 38 year old male who presents to the ED from with complaints of a cough that is minimally productive of sputum that he has had over the last 48 hours.  Patient denies any shortness of breath, denies any chest pain, denies any pleuritic pain.  Patient states he took a home COVID test which was positive.  The patient has a picture of this test on his phone and the test does appear to be positive.  The patient denies any significant underlying pulmonary disease but presents here to the ER for evaluation.    This part of the medical record was transcribed by Garth Manriquez, Medical Scribe, from a dictation done by Moiz Castillo MD.       Past Medical History  History reviewed. No pertinent past medical history.    History reviewed. No pertinent surgical history.    acetaminophen (TYLENOL) 325 MG tablet  ibuprofen (ADVIL/MOTRIN) 400 MG tablet      Allergies   Allergen Reactions    Seasonal Allergies      Family History  Family History   Problem Relation Age of Onset    Cancer No family hx of     Diabetes No family hx of     Coronary Artery Disease No family hx of     Heart Disease No family hx of      Social History   Social History     Tobacco Use    Smoking status: Never    Smokeless tobacco: Never   Vaping Use    Vaping Use: Never used   Substance Use Topics    Alcohol use: No    Drug use: No      Past medical history, past surgical history, medications, allergies, family history, and social history were reviewed with the patient. No additional pertinent items.      A medically appropriate review of systems was performed with pertinent positives and negatives noted in the HPI, and all other systems negative.    Physical Exam   BP: (!) 148/84  Pulse:  "70  Temp: 97.5  F (36.4  C)  Resp: 18  Height: 175.3 cm (5' 9\")  Weight: 69 kg (152 lb 3.2 oz)  SpO2: 99 %  Physical Exam  Vitals and nursing note reviewed.   Constitutional:       Appearance: He is not ill-appearing or diaphoretic.   HENT:      Head: Atraumatic.   Eyes:      Extraocular Movements: Extraocular movements intact.      Pupils: Pupils are equal, round, and reactive to light.   Cardiovascular:      Rate and Rhythm: Regular rhythm.   Pulmonary:      Breath sounds: Normal breath sounds. No wheezing or rales.   Musculoskeletal:         General: No swelling or deformity.      Cervical back: Neck supple.   Neurological:      General: No focal deficit present.      Mental Status: He is alert and oriented to person, place, and time.   Psychiatric:         Mood and Affect: Mood normal.         Behavior: Behavior normal.           ED Course, Procedures, & Data      Procedures       Results for orders placed or performed during the hospital encounter of 10/05/23   Symptomatic Influenza A/B, RSV, & SARS-CoV2 PCR (COVID-19) Nasopharyngeal     Status: Abnormal    Specimen: Nasopharyngeal; Swab   Result Value Ref Range    Influenza A PCR Negative Negative    Influenza B PCR Negative Negative    RSV PCR Negative Negative    SARS CoV2 PCR Positive (A) Negative    Narrative    Testing was performed using the Xpert Xpress CoV2/Flu/RSV Assay on the [employer] GeneXpert Instrument. This test should be ordered for the detection of SARS-CoV-2, influenza, and RSV viruses in individuals who meet clinical and/or epidemiological criteria. Test performance is unknown in asymptomatic patients. This test is for in vitro diagnostic use under the FDA EUA for laboratories certified under CLIA to perform high or moderate complexity testing. This test has not been FDA cleared or approved. A negative result does not rule out the presence of PCR inhibitors in the specimen or target RNA in concentration below the limit of detection for the " assay. If only one viral target is positive but coinfection with multiple targets is suspected, the sample should be re-tested with another FDA cleared, approved, or authorized test, if coinfection would change clinical management. This test was validated by the [facility] and [facility]. These laboratories are certified under the Clinical Laboratory Improvement Amendments of 1988 (CLIA-88) as qualified to perform high complexity laboratory testing.        Critical care was not performed.     Medical Decision Making  The patient's presentation was of low complexity (an acute and uncomplicated illness or injury).    The patient's evaluation involved:  review of 1 test result(s) ordered prior to this encounter (home COVID test on the patient's phone)  ordering and/or review of 3+ test(s) in this encounter (see above)    The patient's management necessitated moderate risk (prescription drug management including medications given in the ED).    Assessment & Plan      I have reviewed the nursing notes.    I have reviewed the findings, diagnosis, plan and need for follow up with the patient.    New Prescriptions    BENZONATATE (TESSALON) 100 MG CAPSULE    Take 1-2 capsules (100-200 mg) by mouth 3 times daily as needed for cough    NIRMATRELVIR AND RITONAVIR (PAXLOVID) 300 MG/100 MG THERAPY PACK    Take 3 tablets by mouth 2 times daily for 5 days       Final diagnoses:   COVID-19 virus infection     Home today to isolate and rest    Fill your prescriptions and take as directed    Stay hydrated    Please make an appointment to follow up with Your Primary Care Provider in 2 weeks  if not improving.    Return to the ER for worsening    Work note given to be off work for the next 5 days.    Routine discharge instructions were given for the above diagnosis    IGarth, am serving as a trained medical scribe to document services personally performed by Moiz Castillo MD, based on the provider's statements  to me.     I, Moiz Castillo MD, was physically present and have reviewed and verified the accuracy of this note documented by Garth Manriquez.      Moiz Castillo MD  [facility] EMERGENCY DEPARTMENT  10/5/2023     Moiz Castillo MD  10/05/23 [id number]

## 2024-07-03 ENCOUNTER — HOSPITAL ENCOUNTER (EMERGENCY)
Facility: CLINIC | Age: 39
Discharge: HOME | End: 2024-07-03
Attending: EMERGENCY MEDICINE | Admitting: EMERGENCY MEDICINE
Payer: OTHER MISCELLANEOUS

## 2024-07-03 ENCOUNTER — APPOINTMENT (OUTPATIENT)
Dept: GENERAL RADIOLOGY | Facility: CLINIC | Age: 39
End: 2024-07-03
Attending: EMERGENCY MEDICINE
Payer: OTHER MISCELLANEOUS

## 2024-07-03 VITALS
HEIGHT: 69 IN | BODY MASS INDEX: 22.47 KG/M2 | WEIGHT: 151.7 LBS | SYSTOLIC BLOOD PRESSURE: 144 MMHG | HEART RATE: 62 BPM | DIASTOLIC BLOOD PRESSURE: 82 MMHG | TEMPERATURE: 97.9 F | RESPIRATION RATE: 18 BRPM

## 2024-07-03 DIAGNOSIS — S46.811A STRAIN OF RIGHT DELTOID MUSCLE, INITIAL ENCOUNTER: ICD-10-CM

## 2024-07-03 DIAGNOSIS — K11.20 SIALADENITIS: ICD-10-CM

## 2024-07-03 PROCEDURE — 73030 X-RAY EXAM OF SHOULDER: CPT | Mod: RT

## 2024-07-03 PROCEDURE — 99283 EMERGENCY DEPT VISIT LOW MDM: CPT | Performed by: EMERGENCY MEDICINE

## 2024-07-03 RX ORDER — IBUPROFEN 200 MG
600 TABLET ORAL 3 TIMES DAILY
Qty: 45 TABLET | Refills: 0 | Status: SHIPPED | OUTPATIENT
Start: 2024-07-03 | End: 2024-07-08

## 2024-07-03 ASSESSMENT — ACTIVITIES OF DAILY LIVING (ADL): ADLS_ACUITY_SCORE: 35

## 2024-07-03 ASSESSMENT — COLUMBIA-SUICIDE SEVERITY RATING SCALE - C-SSRS
1. IN THE PAST MONTH, HAVE YOU WISHED YOU WERE DEAD OR WISHED YOU COULD GO TO SLEEP AND NOT WAKE UP?: NO
2. HAVE YOU ACTUALLY HAD ANY THOUGHTS OF KILLING YOURSELF IN THE PAST MONTH?: NO
6. HAVE YOU EVER DONE ANYTHING, STARTED TO DO ANYTHING, OR PREPARED TO DO ANYTHING TO END YOUR LIFE?: NO

## 2024-07-03 NOTE — ED PROVIDER NOTES
"ED Provider Note  [facility]      History     Chief Complaint   Patient presents with    Shoulder Pain     Lifting paper towels from the ground on Monday, Kila sharp pain in Rt Shoulder. 9/10 pain.     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  Patient had right shoulder pain and afterwards was not able to even carry the box so he had to put it down.  The patient denies any direct trauma to his shoulder and he presents here to the ER for evaluation.  Denies any other injury.    This part of the medical record was transcribed by Robert Bernard, Medical Scribe, from a dictation done by Moiz Castillo MD.     Past Medical History  No past medical history on file.  No past surgical history on file.    acetaminophen (TYLENOL) 325 MG tablet  benzonatate (TESSALON) 100 MG capsule  ibuprofen (ADVIL/MOTRIN) 200 MG tablet      Allergies   Allergen Reactions    Seasonal Allergies      Family History  Family History   Problem Relation Age of Onset    Cancer No family hx of     Diabetes No family hx of     Coronary Artery Disease No family hx of     Heart Disease No family hx of      Social History   Social History     Tobacco Use    Smoking status: Never    Smokeless tobacco: Never   Vaping Use    Vaping status: Never Used   Substance Use Topics    Alcohol use: No    Drug use: No      A medically appropriate review of systems was performed with pertinent positives and negatives noted in the HPI, and all other systems negative.    Physical Exam   BP: (!) 144/82  Pulse: 62  Temp: 97.9  F (36.6  C)  Resp: 18  Height: 175.3 cm (5' 9\")  Weight: 68.8 kg (151 lb 11.2 oz)  Physical Exam  Vitals and nursing note reviewed.   Constitutional:       Appearance: He is not ill-appearing or diaphoretic.   HENT:      Head: Atraumatic.   Eyes:      Extraocular " Movements: Extraocular movements intact.      Pupils: Pupils are equal, round, and reactive to light.   Pulmonary:      Effort: No respiratory distress.   Musculoskeletal:      Cervical back: Neck supple.      Comments: he has tenderness over the right deltoid muscle below the AC joint and without definite tear palpable.  The biceps tendons are intact and nontender; distal CMS intact   Neurological:      General: No focal deficit present.      Mental Status: He is alert and oriented to person, place, and time.   Psychiatric:         Mood and Affect: Mood normal.           ED Course, Procedures, & Data      Orders Placed This Encounter   Procedures    ARM SLING L    XR Shoulder Right G/E 3 Views       Procedures            Results for orders placed or performed during the hospital encounter of 07/03/24   XR Shoulder Right G/E 3 Views     Status: None    Narrative    XR SHOULDER RIGHT G/E 3 VIEWS 7/3/2024 8:56 AM     HISTORY: pain    COMPARISON: None.         Impression    IMPRESSION: The right glenohumeral and acromioclavicular joints are  negative for fracture or dislocation. Hypertrophic changes AC joint.    TIFFANY PINA MD         SYSTEM ID:  [id number]            Critical care was not performed.     Medical Decision Making  The patient's presentation was of low complexity (an acute and uncomplicated illness or injury).    The patient's evaluation involved:  ordering and/or review of 1 test(s) in this encounter (see above)    The patient's management necessitated moderate risk (prescription drug management including medications given in the ED).    Assessment & Plan      I have reviewed the nursing notes. I have reviewed the findings, diagnosis, plan and need for follow up with the patient.       Review of your medicines        UNREVIEWED medicines. Ask your doctor about these medicines        Dose / Directions   acetaminophen 325 MG tablet  Commonly known as: TYLENOL  Used for: Sialadenitis      Dose: 325-650  mg  Take 1-2 tablets (325-650 mg) by mouth every 6 hours as needed for mild pain  Quantity: 90 tablet  Refills: 3     benzonatate 100 MG capsule  Commonly known as: TESSALON      Dose: 100-200 mg  Take 1-2 capsules (100-200 mg) by mouth 3 times daily as needed for cough  Quantity: 30 capsule  Refills: 0            CONTINUE these medicines which may have CHANGED, or have new prescriptions. If we are uncertain of the size of tablets/capsules you have at home, strength may be listed as something that might have changed.        Dose / Directions   ibuprofen 200 MG tablet  Commonly known as: ADVIL/MOTRIN  This may have changed:   medication strength  how much to take  when to take this  reasons to take this  Used for: Sialadenitis      Dose: 600 mg  Take 3 tablets (600 mg) by mouth 3 times daily for 5 days  Quantity: 45 tablet  Refills: 0               Where to get your medicines        Some of these will need a paper prescription and others can be bought over the counter. Ask your nurse if you have questions.    Bring a paper prescription for each of these medications  ibuprofen 200 MG tablet           Final diagnoses:   Strain of right deltoid muscle, initial encounter     Wear the sling in your right arm for the next 2 to 3 days.    After 3 days remove your arm from the sling and start range of motion exercises as demonstrated 3 times daily    May use ice to your right shoulder for comfort over the first 24 to 48 hours    Please make an appointment to follow up with Your Primary Care Provider in 7-10 days unless symptoms completely resolve.    Work note given      Moiz Castillo Md  [facility] EMERGENCY DEPARTMENT  7/3/2024     Moiz Castillo MD  07/03/24 0904

## 2024-07-03 NOTE — Clinical Note
Martínez Fuentes was seen and treated in our emergency department on 7/3/2024.  He may return to work on 07/05/2024.  Patient may return to work on 7 5 but will be unable to use his right arm for 1 week     If you have any questions or concerns, please don't hesitate to call.      Moiz Castillo MD

## 2024-07-03 NOTE — DISCHARGE INSTRUCTIONS
Wear the sling in your right arm for the next 2 to 3 days.    After 3 days remove your arm from the sling and start range of motion exercises as demonstrated 3 times daily    May use ice to your right shoulder for comfort over the first 24 to 48 hours    Please make an appointment to follow up with Your Primary Care Provider in 7-10 days unless symptoms completely resolve.

## 2024-07-03 NOTE — ED TRIAGE NOTES
Lifting paper towels from the ground on Monday, [place] sharp pain in Rt Shoulder. 9/10 pain.     Triage Assessment (Adult)       Row Name 07/03/24 0816          Triage Assessment    Airway WDL WDL        Respiratory WDL    Respiratory WDL WDL        Skin Circulation/Temperature WDL    Skin Circulation/Temperature WDL X  Rt shoulder pain        Cardiac WDL    Cardiac WDL WDL        Peripheral/Neurovascular WDL    Peripheral Neurovascular WDL WDL        Cognitive/Neuro/Behavioral WDL    Cognitive/Neuro/Behavioral WDL WDL

## 2024-12-29 ENCOUNTER — APPOINTMENT (OUTPATIENT)
Dept: GENERAL RADIOLOGY | Facility: CLINIC | Age: 39
End: 2024-12-29
Attending: STUDENT IN AN ORGANIZED HEALTH CARE EDUCATION/TRAINING PROGRAM
Payer: COMMERCIAL

## 2024-12-29 ENCOUNTER — HOSPITAL ENCOUNTER (EMERGENCY)
Facility: CLINIC | Age: 39
Discharge: HOME | End: 2024-12-29
Attending: STUDENT IN AN ORGANIZED HEALTH CARE EDUCATION/TRAINING PROGRAM | Admitting: STUDENT IN AN ORGANIZED HEALTH CARE EDUCATION/TRAINING PROGRAM
Payer: COMMERCIAL

## 2024-12-29 VITALS
HEART RATE: 84 BPM | SYSTOLIC BLOOD PRESSURE: 106 MMHG | RESPIRATION RATE: 16 BRPM | OXYGEN SATURATION: 98 % | WEIGHT: 151 LBS | TEMPERATURE: 99.1 F | DIASTOLIC BLOOD PRESSURE: 67 MMHG | HEIGHT: 69 IN | BODY MASS INDEX: 22.36 KG/M2

## 2024-12-29 DIAGNOSIS — J10.1 INFLUENZA A: ICD-10-CM

## 2024-12-29 LAB
ALBUMIN SERPL BCG-MCNC: 4.1 G/DL (ref 3.5–5.2)
ALP SERPL-CCNC: 56 U/L (ref 40–150)
ALT SERPL W P-5'-P-CCNC: 29 U/L (ref 0–70)
ANION GAP SERPL CALCULATED.3IONS-SCNC: 10 MMOL/L (ref 7–15)
AST SERPL W P-5'-P-CCNC: 38 U/L (ref 0–45)
BASOPHILS # BLD AUTO: 0 10E3/UL (ref 0–0.2)
BASOPHILS NFR BLD AUTO: 0 %
BILIRUB SERPL-MCNC: 0.3 MG/DL
BUN SERPL-MCNC: 14.2 MG/DL (ref 6–20)
CALCIUM SERPL-MCNC: 8.8 MG/DL (ref 8.8–10.4)
CHLORIDE SERPL-SCNC: 100 MMOL/L (ref 98–107)
CREAT SERPL-MCNC: 0.87 MG/DL (ref 0.67–1.17)
D DIMER PPP FEU-MCNC: <0.27 UG/ML FEU (ref 0–0.5)
EGFRCR SERPLBLD CKD-EPI 2021: >90 ML/MIN/1.73M2
EOSINOPHIL # BLD AUTO: 0 10E3/UL (ref 0–0.7)
EOSINOPHIL NFR BLD AUTO: 0 %
ERYTHROCYTE [DISTWIDTH] IN BLOOD BY AUTOMATED COUNT: 12.3 % (ref 10–15)
FLUAV RNA SPEC QL NAA+PROBE: POSITIVE
FLUBV RNA RESP QL NAA+PROBE: NEGATIVE
GLUCOSE SERPL-MCNC: 96 MG/DL (ref 70–99)
HCO3 SERPL-SCNC: 26 MMOL/L (ref 22–29)
HCT VFR BLD AUTO: 41.3 % (ref 40–53)
HGB BLD-MCNC: 13.8 G/DL (ref 13.3–17.7)
IMM GRANULOCYTES # BLD: 0 10E3/UL
IMM GRANULOCYTES NFR BLD: 1 %
INR PPP: 1.02 (ref 0.85–1.15)
LACTATE SERPL-SCNC: 0.9 MMOL/L (ref 0.7–2)
LYMPHOCYTES # BLD AUTO: 1.5 10E3/UL (ref 0.8–5.3)
LYMPHOCYTES NFR BLD AUTO: 36 %
MCH RBC QN AUTO: 28.5 PG (ref 26.5–33)
MCHC RBC AUTO-ENTMCNC: 33.4 G/DL (ref 31.5–36.5)
MCV RBC AUTO: 85 FL (ref 78–100)
MONOCYTES # BLD AUTO: 0.5 10E3/UL (ref 0–1.3)
MONOCYTES NFR BLD AUTO: 12 %
NEUTROPHILS # BLD AUTO: 2.1 10E3/UL (ref 1.6–8.3)
NEUTROPHILS NFR BLD AUTO: 51 %
NRBC # BLD AUTO: 0 10E3/UL
NRBC BLD AUTO-RTO: 0 /100
PLATELET # BLD AUTO: 190 10E3/UL (ref 150–450)
POTASSIUM SERPL-SCNC: 4.1 MMOL/L (ref 3.4–5.3)
PROT SERPL-MCNC: 7.4 G/DL (ref 6.4–8.3)
RBC # BLD AUTO: 4.84 10E6/UL (ref 4.4–5.9)
RSV RNA SPEC NAA+PROBE: NEGATIVE
SARS-COV-2 RNA RESP QL NAA+PROBE: NEGATIVE
SODIUM SERPL-SCNC: 136 MMOL/L (ref 135–145)
TROPONIN T SERPL HS-MCNC: <6 NG/L
WBC # BLD AUTO: 4.2 10E3/UL (ref 4–11)

## 2024-12-29 PROCEDURE — 85025 COMPLETE CBC W/AUTO DIFF WBC: CPT | Performed by: STUDENT IN AN ORGANIZED HEALTH CARE EDUCATION/TRAINING PROGRAM

## 2024-12-29 PROCEDURE — 71046 X-RAY EXAM CHEST 2 VIEWS: CPT

## 2024-12-29 PROCEDURE — 84484 ASSAY OF TROPONIN QUANT: CPT | Performed by: STUDENT IN AN ORGANIZED HEALTH CARE EDUCATION/TRAINING PROGRAM

## 2024-12-29 PROCEDURE — 99285 EMERGENCY DEPT VISIT HI MDM: CPT | Mod: 25 | Performed by: STUDENT IN AN ORGANIZED HEALTH CARE EDUCATION/TRAINING PROGRAM

## 2024-12-29 PROCEDURE — 96361 HYDRATE IV INFUSION ADD-ON: CPT | Performed by: STUDENT IN AN ORGANIZED HEALTH CARE EDUCATION/TRAINING PROGRAM

## 2024-12-29 PROCEDURE — 250N000013 HC RX MED GY IP 250 OP 250 PS 637: Performed by: STUDENT IN AN ORGANIZED HEALTH CARE EDUCATION/TRAINING PROGRAM

## 2024-12-29 PROCEDURE — 87637 SARSCOV2&INF A&B&RSV AMP PRB: CPT | Performed by: STUDENT IN AN ORGANIZED HEALTH CARE EDUCATION/TRAINING PROGRAM

## 2024-12-29 PROCEDURE — 82374 ASSAY BLOOD CARBON DIOXIDE: CPT | Performed by: STUDENT IN AN ORGANIZED HEALTH CARE EDUCATION/TRAINING PROGRAM

## 2024-12-29 PROCEDURE — 83605 ASSAY OF LACTIC ACID: CPT | Performed by: STUDENT IN AN ORGANIZED HEALTH CARE EDUCATION/TRAINING PROGRAM

## 2024-12-29 PROCEDURE — 93005 ELECTROCARDIOGRAM TRACING: CPT | Performed by: STUDENT IN AN ORGANIZED HEALTH CARE EDUCATION/TRAINING PROGRAM

## 2024-12-29 PROCEDURE — 96374 THER/PROPH/DIAG INJ IV PUSH: CPT | Performed by: STUDENT IN AN ORGANIZED HEALTH CARE EDUCATION/TRAINING PROGRAM

## 2024-12-29 PROCEDURE — 250N000011 HC RX IP 250 OP 636: Performed by: STUDENT IN AN ORGANIZED HEALTH CARE EDUCATION/TRAINING PROGRAM

## 2024-12-29 PROCEDURE — 85610 PROTHROMBIN TIME: CPT | Performed by: STUDENT IN AN ORGANIZED HEALTH CARE EDUCATION/TRAINING PROGRAM

## 2024-12-29 PROCEDURE — 71046 X-RAY EXAM CHEST 2 VIEWS: CPT | Mod: 26 | Performed by: STUDENT IN AN ORGANIZED HEALTH CARE EDUCATION/TRAINING PROGRAM

## 2024-12-29 PROCEDURE — 258N000003 HC RX IP 258 OP 636: Performed by: STUDENT IN AN ORGANIZED HEALTH CARE EDUCATION/TRAINING PROGRAM

## 2024-12-29 PROCEDURE — 36415 COLL VENOUS BLD VENIPUNCTURE: CPT | Performed by: STUDENT IN AN ORGANIZED HEALTH CARE EDUCATION/TRAINING PROGRAM

## 2024-12-29 PROCEDURE — 99284 EMERGENCY DEPT VISIT MOD MDM: CPT | Performed by: STUDENT IN AN ORGANIZED HEALTH CARE EDUCATION/TRAINING PROGRAM

## 2024-12-29 PROCEDURE — 85379 FIBRIN DEGRADATION QUANT: CPT | Performed by: STUDENT IN AN ORGANIZED HEALTH CARE EDUCATION/TRAINING PROGRAM

## 2024-12-29 PROCEDURE — 82310 ASSAY OF CALCIUM: CPT | Performed by: STUDENT IN AN ORGANIZED HEALTH CARE EDUCATION/TRAINING PROGRAM

## 2024-12-29 PROCEDURE — 93010 ELECTROCARDIOGRAM REPORT: CPT | Performed by: STUDENT IN AN ORGANIZED HEALTH CARE EDUCATION/TRAINING PROGRAM

## 2024-12-29 RX ORDER — ACETAMINOPHEN 500 MG
1000 TABLET ORAL EVERY 6 HOURS PRN
Qty: 112 TABLET | Refills: 0 | Status: SHIPPED | OUTPATIENT
Start: 2024-12-29

## 2024-12-29 RX ORDER — NAPROXEN 500 MG/1
500 TABLET ORAL 2 TIMES DAILY WITH MEALS
Qty: 14 TABLET | Refills: 0 | Status: SHIPPED | OUTPATIENT
Start: 2024-12-29 | End: 2024-12-29

## 2024-12-29 RX ORDER — KETOROLAC TROMETHAMINE 15 MG/ML
15 INJECTION, SOLUTION INTRAMUSCULAR; INTRAVENOUS ONCE
Status: COMPLETED | OUTPATIENT
Start: 2024-12-29 | End: 2024-12-29

## 2024-12-29 RX ORDER — ACETAMINOPHEN 500 MG
1000 TABLET ORAL EVERY 6 HOURS PRN
Qty: 112 TABLET | Refills: 0 | Status: SHIPPED | OUTPATIENT
Start: 2024-12-29 | End: 2024-12-29

## 2024-12-29 RX ORDER — OXYCODONE HYDROCHLORIDE 5 MG/1
5 TABLET ORAL ONCE
Status: COMPLETED | OUTPATIENT
Start: 2024-12-29 | End: 2024-12-29

## 2024-12-29 RX ORDER — NAPROXEN 500 MG/1
500 TABLET ORAL 2 TIMES DAILY WITH MEALS
Qty: 14 TABLET | Refills: 0 | Status: SHIPPED | OUTPATIENT
Start: 2024-12-29

## 2024-12-29 RX ORDER — ACETAMINOPHEN 500 MG
1000 TABLET ORAL ONCE
Status: COMPLETED | OUTPATIENT
Start: 2024-12-29 | End: 2024-12-29

## 2024-12-29 RX ORDER — OSELTAMIVIR PHOSPHATE 75 MG/1
75 CAPSULE ORAL 2 TIMES DAILY
Qty: 10 CAPSULE | Refills: 0 | Status: SHIPPED | OUTPATIENT
Start: 2024-12-29

## 2024-12-29 RX ORDER — OSELTAMIVIR PHOSPHATE 75 MG/1
75 CAPSULE ORAL 2 TIMES DAILY
Qty: 10 CAPSULE | Refills: 0 | Status: SHIPPED | OUTPATIENT
Start: 2024-12-29 | End: 2024-12-29

## 2024-12-29 RX ADMIN — OXYCODONE HYDROCHLORIDE 5 MG: 5 TABLET ORAL at 13:44

## 2024-12-29 RX ADMIN — ACETAMINOPHEN 1000 MG: 500 TABLET ORAL at 13:44

## 2024-12-29 RX ADMIN — KETOROLAC TROMETHAMINE 15 MG: 15 INJECTION, SOLUTION INTRAMUSCULAR; INTRAVENOUS at 13:54

## 2024-12-29 RX ADMIN — SODIUM CHLORIDE 1000 ML: 9 INJECTION, SOLUTION INTRAVENOUS at 13:53

## 2024-12-29 ASSESSMENT — COLUMBIA-SUICIDE SEVERITY RATING SCALE - C-SSRS
2. HAVE YOU ACTUALLY HAD ANY THOUGHTS OF KILLING YOURSELF IN THE PAST MONTH?: NO
1. IN THE PAST MONTH, HAVE YOU WISHED YOU WERE DEAD OR WISHED YOU COULD GO TO SLEEP AND NOT WAKE UP?: NO
6. HAVE YOU EVER DONE ANYTHING, STARTED TO DO ANYTHING, OR PREPARED TO DO ANYTHING TO END YOUR LIFE?: NO

## 2024-12-29 ASSESSMENT — ACTIVITIES OF DAILY LIVING (ADL)
ADLS_ACUITY_SCORE: 41
ADLS_ACUITY_SCORE: 41

## 2024-12-29 NOTE — ED PROVIDER NOTES
"ED Provider Note  [facility]      History     Chief Complaint   Patient presents with    Chest Pain    Shortness of Breath     HPI  Martínez Fuentes is a 39 year old male with no significant medical history who presents to the ER for evaluation of chest pain, SOB.  Patient reports symptoms for 2 to 3 days.  He is denying significant cough.  He feels his pain is pleuritic.  No severe fevers or chills.  No sore throat runny nose or sinus congestion.  No abdominal pain nausea or vomiting.  Chest pain is sharp left-sided and worse with inspiration.  Denies significant medical history.    Past Medical History  History reviewed. No pertinent past medical history.  History reviewed. No pertinent surgical history.  acetaminophen (TYLENOL) 500 MG tablet  naproxen (NAPROSYN) 500 MG tablet  oseltamivir (TAMIFLU) 75 MG capsule  acetaminophen (TYLENOL) 325 MG tablet  benzonatate (TESSALON) 100 MG capsule      Allergies   Allergen Reactions    Seasonal Allergies      Family History  Family History   Problem Relation Age of Onset    Cancer No family hx of     Diabetes No family hx of     Coronary Artery Disease No family hx of     Heart Disease No family hx of      Social History   Social History     Tobacco Use    Smoking status: Never    Smokeless tobacco: Never   Vaping Use    Vaping status: Never Used   Substance Use Topics    Alcohol use: No    Drug use: No      A medically appropriate review of systems was performed with pertinent positives and negatives noted in the HPI, and all other systems negative.    Physical Exam   BP: 126/84  Pulse: 84  Temp: 99.1  F (37.3  C)  Resp: 16  Height: 175.3 cm (5' 9\")  Weight: 68.5 kg (151 lb)  SpO2: 97 %  Physical Exam  Vital Signs Reviewed  Gen: Well nourished, well developed, resting comfortably, no acute distress  HEENT: NC/AT, PERRL, EOMI, MMM  Neck: Supple, FROM  CV: Regular Rate, no murmur/rub/gallop  Lungs/Chest: Normal Effort, CTAB  Abd: Non-distended, " non-tender  MSK/Back: FROM, no visible deformity  Neuro: A&Ox3, GCS 15, CN II-XII unremarkable  Skin: Warm, Dry, Intact, no visible lesions    ED Course, Procedures, & Data      Procedures            EKG Interpretation:      Interpreted by Tristen Vaughn MD  Time reviewed: 1305  Symptoms at time of EKG: Chest pain   Rhythm: normal sinus   Rate: Normal  Axis: Normal  Ectopy: none  Conduction: normal  ST Segments/ T Waves: No ST-T wave changes and No acute ischemic changes  Q Waves: none    Clinical Impression: normal EKG               Results for orders placed or performed during the hospital encounter of 12/29/24   XR Chest 2 Views     Status: None (Preliminary result)    Impression    RESIDENT PRELIMINARY INTERPRETATION  IMPRESSION:   No acute airspace disease.   INR     Status: Normal   Result Value Ref Range    INR 1.02 0.85 - 1.15   D dimer quantitative     Status: Normal   Result Value Ref Range    D-Dimer Quantitative <0.27 0.00 - 0.50 ug/mL FEU    Narrative    This D-dimer assay is intended for use in conjunction with a clinical pretest probability assessment model to exclude pulmonary embolism (PE) and deep venous thrombosis (DVT) in outpatients suspected of PE or DVT. The cut-off value is 0.50 ug/mL FEU.   Comprehensive metabolic panel     Status: Normal   Result Value Ref Range    Sodium 136 135 - 145 mmol/L    Potassium 4.1 3.4 - 5.3 mmol/L    Carbon Dioxide (CO2) 26 22 - 29 mmol/L    Anion Gap 10 7 - 15 mmol/L    Urea Nitrogen 14.2 6.0 - 20.0 mg/dL    Creatinine 0.87 0.67 - 1.17 mg/dL    GFR Estimate >90 >60 mL/min/1.73m2    Calcium 8.8 8.8 - 10.4 mg/dL    Chloride 100 98 - 107 mmol/L    Glucose 96 70 - 99 mg/dL    Alkaline Phosphatase 56 40 - 150 U/L    AST 38 0 - 45 U/L    ALT 29 0 - 70 U/L    Protein Total 7.4 6.4 - 8.3 g/dL    Albumin 4.1 3.5 - 5.2 g/dL    Bilirubin Total 0.3 <=1.2 mg/dL   Troponin T, High Sensitivity     Status: Normal   Result Value Ref Range    Troponin T, High Sensitivity <6  <=22 ng/L   Lactic acid whole blood with 1x repeat in 2 hr when >2     Status: Normal   Result Value Ref Range    Lactic Acid, Initial 0.9 0.7 - 2.0 mmol/L   Influenza A/B, RSV and SARS-CoV2 PCR (COVID-19) Nasopharyngeal     Status: Abnormal    Specimen: Nasopharyngeal; Swab   Result Value Ref Range    Influenza A PCR Positive (A) Negative    Influenza B PCR Negative Negative    RSV PCR Negative Negative    SARS CoV2 PCR Negative Negative    Narrative    Testing was performed using the Xpert Xpress CoV2/Flu/RSV Assay on the Cepheid GeneXpert Instrument. This test should be ordered for the detection of SARS-CoV2, influenza, and RSV viruses in individuals with signs and symptoms of respiratory tract infection. This test is for in vitro diagnostic use under the [place] FDA for laboratories certified under CLIA to perform high or moderate complexity testing. This test has been [place] FDA cleared. A negative result does not rule out the presence of PCR inhibitors in the specimen or target RNA in concentration below the limit of detection for the assay. If only one viral target is positive but coinfection with multiple targets is suspected, the sample should be re-tested with another FDA cleared, approved, or authorized test, if coninfection would change clinical management. This test was validated by the [facility] [employer] [facility]. These laboratories are certified under the Clinical Laboratory Improvement Amendments of 1988 (CLIA-88) as qualified to perfom high complexity laboratory testing.   CBC with platelets and differential     Status: None   Result Value Ref Range    WBC Count 4.2 4.0 - 11.0 10e3/uL    RBC Count 4.84 4.40 - 5.90 10e6/uL    Hemoglobin 13.8 13.3 - 17.7 g/dL    Hematocrit 41.3 40.0 - 53.0 %    MCV 85 78 - 100 fL    MCH 28.5 26.5 - 33.0 pg    MCHC 33.4 31.5 - 36.5 g/dL    RDW 12.3 10.0 - 15.0 %    Platelet Count 190 150 - 450 10e3/uL    % Neutrophils 51 %    % Lymphocytes 36 %    % Monocytes 12 %    %  Eosinophils 0 %    % Basophils 0 %    % Immature Granulocytes 1 %    NRBCs per 100 WBC 0 <1 /100    Absolute Neutrophils 2.1 1.6 - 8.3 10e3/uL    Absolute Lymphocytes 1.5 0.8 - 5.3 10e3/uL    Absolute Monocytes 0.5 0.0 - 1.3 10e3/uL    Absolute Eosinophils 0.0 0.0 - 0.7 10e3/uL    Absolute Basophils 0.0 0.0 - 0.2 10e3/uL    Absolute Immature Granulocytes 0.0 <=0.4 10e3/uL    Absolute NRBCs 0.0 10e3/uL   EKG 12-lead     Status: None (Preliminary result)   Result Value Ref Range    Systolic Blood Pressure  mmHg    Diastolic Blood Pressure  mmHg    Ventricular Rate 87 BPM    Atrial Rate 87 BPM    RI Interval 160 ms    QRS Duration 98 ms     ms    QTc 409 ms    P Axis 50 degrees    R AXIS 44 degrees    T Axis 52 degrees    Interpretation ECG Sinus rhythm  Normal ECG      CBC with platelets differential     Status: None    Narrative    The following orders were created for panel order CBC with platelets differential.  Procedure                               Abnormality         Status                     ---------                               -----------         ------                     CBC with platelets and d...[[id number]]                      Final result                 Please view results for these tests on the individual orders.     Medications   sodium chloride 0.9% BOLUS 1,000 mL (0 mLs Intravenous Stopped 12/29/24 1527)   acetaminophen (TYLENOL) tablet 1,000 mg (1,000 mg Oral $Given 12/29/24 1344)   ketorolac (TORADOL) injection 15 mg (15 mg Intravenous $Given 12/29/24 1354)   oxyCODONE (ROXICODONE) tablet 5 mg (5 mg Oral $Given 12/29/24 1344)     Labs Ordered and Resulted from Time of ED Arrival to Time of ED Departure   INFLUENZA A/B, RSV AND SARS-COV2 PCR - Abnormal       Result Value    Influenza A PCR Positive (*)     Influenza B PCR Negative      RSV PCR Negative      SARS CoV2 PCR Negative     INR - Normal    INR 1.02     D DIMER QUANTITATIVE - Normal    D-Dimer Quantitative <0.27      COMPREHENSIVE METABOLIC PANEL - Normal    Sodium 136      Potassium 4.1      Carbon Dioxide (CO2) 26      Anion Gap 10      Urea Nitrogen 14.2      Creatinine 0.87      GFR Estimate >90      Calcium 8.8      Chloride 100      Glucose 96      Alkaline Phosphatase 56      AST 38      ALT 29      Protein Total 7.4      Albumin 4.1      Bilirubin Total 0.3     TROPONIN T, HIGH SENSITIVITY - Normal    Troponin T, High Sensitivity <6     LACTIC ACID WHOLE BLOOD WITH 1X REPEAT IN 2 HR WHEN >2 - Normal    Lactic Acid, Initial 0.9     CBC WITH PLATELETS AND DIFFERENTIAL    WBC Count 4.2      RBC Count 4.84      Hemoglobin 13.8      Hematocrit 41.3      MCV 85      MCH 28.5      MCHC 33.4      RDW 12.3      Platelet Count 190      % Neutrophils 51      % Lymphocytes 36      % Monocytes 12      % Eosinophils 0      % Basophils 0      % Immature Granulocytes 1      NRBCs per 100 WBC 0      Absolute Neutrophils 2.1      Absolute Lymphocytes 1.5      Absolute Monocytes 0.5      Absolute Eosinophils 0.0      Absolute Basophils 0.0      Absolute Immature Granulocytes 0.0      Absolute NRBCs 0.0       XR Chest 2 Views   Preliminary Result   RESIDENT PRELIMINARY INTERPRETATION   IMPRESSION:    No acute airspace disease.             Critical care was not performed.     Medical Decision Making  The patient's presentation was of moderate complexity (an undiagnosed new problem with uncertain prognosis).    The patient's evaluation involved:  ordering and/or review of 3+ test(s) in this encounter (see separate area of note for details)  independent interpretation of testing performed by another health professional (CXR - no obvious PNA or PTX)    The patient's management necessitated moderate risk (prescription drug management including medications given in the ED).    Assessment & Plan    Martínez Fuentes is a 39 year old male with no significant medical history who presents to the ER for evaluation of chest pain, SOB.  Patient is  afebrile with reassuring vital signs resting comfortably in no acute distress.  Moving good air on pulmonary exam.    X-ray was obtained.  Preliminary read by radiology resident is negative.  I also do not appreciate any obvious pneumonia or consolidative opacities, no obvious pneumothorax.  Laboratory work was reassuring with undetectable troponin, negative D-dimer.  X-ray with no signs of acute ischemia.  Lactate normal.  No leukocytosis.  Influenza A is positive.  Likely has influenza virus infection with some mild pleurisy causing symptoms.  Will prescribe naproxen for NSAID therapy, take with Tylenol for pain or discomfort.  He is around the end of useful timing for Tamiflu but given his symptoms he would be willing to take this medication.    Patient was discharged with prescriptions for NSAIDs, acetaminophen, and Tamiflu.  Instructed to follow-up with primary care if symptoms not improving or if he needs additional work restrictions/return to work precautions beyond the next 3 days.  Return to emergency department precautions provided.    I have reviewed the nursing notes. I have reviewed the findings, diagnosis, plan and need for follow up with the patient.    New Prescriptions    ACETAMINOPHEN (TYLENOL) 500 MG TABLET    Take 2 tablets (1,000 mg) by mouth every 6 hours as needed.    NAPROXEN (NAPROSYN) 500 MG TABLET    Take 1 tablet (500 mg) by mouth 2 times daily (with meals) for 7 days.    OSELTAMIVIR (TAMIFLU) 75 MG CAPSULE    Take 1 capsule (75 mg) by mouth 2 times daily for 5 days.       Final diagnoses:   Influenza A       Tristen Vaughn Jr., MD   [facility] EMERGENCY DEPARTMENT  12/29/2024     Tristen Vaughn MD  12/29/24 [id number]

## 2024-12-29 NOTE — DISCHARGE INSTRUCTIONS
Thank you for coming to the [facility].  You were seen in the emergency department.  Your x-rays and laboratory workup today are reassuring.  Your viral swab is positive for influenza A.  This is the likely cause of your symptoms.  We are prescribing medication to take for pain and discomfort as well as a medication which may shorten the course of your illness.    No acute helping or worsening symptoms please do not hesitate to return to the emergency department.

## 2024-12-29 NOTE — ED TRIAGE NOTES
"Triage Assessment & Note:    /84   Pulse 84   Temp 99.1  F (37.3  C) (Oral)   Resp 16   Ht 1.753 m (5' 9\")   Wt 68.5 kg (151 lb)   SpO2 97%   BMI 22.30 kg/m        Patient presents with: Pt comes ambulatory to triage with reports of chest pain/ SOB. No reports of cough or travel.     Home Treatments/Remedies: Home medications    Febrile / Afebrile: afebrile    Duration of C/o: 3 days    Julieta Jiménez RN  December 29, 2024            "

## 2024-12-29 NOTE — Clinical Note
Martínez Fuentes was seen and treated in our emergency department on 12/29/2024.  He may return to work on 01/02/2025.  Mr. Fuentes was seen in the ED. He was diagnosed with Influenza, a contagious respiratory virus. He will benefit from some time off of work to convalesce and recover. Thank you for your understanding and cooperation in the provision of his medical care.     If you have any questions or concerns, please don't hesitate to call.      Tristen Vaughn MD

## 2024-12-29 NOTE — Clinical Note
Martínez Fuentes was seen and treated in our emergency department on 12/29/2024.  He may return to work on 01/01/2025.  Mr. Fuentes was seen in the ED. He was diagnosed with Influenza, a contagious respiratory virus. He will benefit from some time off of work to convalesce and recover. Thank you for your understanding and cooperation in the provision of his medical care.     If you have any questions or concerns, please don't hesitate to call.      Tristen Vaughn MD

## 2024-12-30 LAB
ATRIAL RATE - MUSE: 87 BPM
DIASTOLIC BLOOD PRESSURE - MUSE: NORMAL MMHG
INTERPRETATION ECG - MUSE: NORMAL
P AXIS - MUSE: 50 DEGREES
PR INTERVAL - MUSE: 160 MS
QRS DURATION - MUSE: 98 MS
QT - MUSE: 340 MS
QTC - MUSE: 409 MS
R AXIS - MUSE: 44 DEGREES
SYSTOLIC BLOOD PRESSURE - MUSE: NORMAL MMHG
T AXIS - MUSE: 52 DEGREES
VENTRICULAR RATE- MUSE: 87 BPM